# Patient Record
Sex: FEMALE | Race: WHITE | NOT HISPANIC OR LATINO | Employment: UNEMPLOYED | URBAN - NONMETROPOLITAN AREA
[De-identification: names, ages, dates, MRNs, and addresses within clinical notes are randomized per-mention and may not be internally consistent; named-entity substitution may affect disease eponyms.]

---

## 2018-08-07 ENCOUNTER — TRANSCRIBE ORDERS (OUTPATIENT)
Dept: ADMINISTRATIVE | Facility: HOSPITAL | Age: 43
End: 2018-08-07

## 2018-08-07 DIAGNOSIS — R19.01 ABDOMINAL MASS, RIGHT UPPER QUADRANT: Primary | ICD-10-CM

## 2018-09-21 ENCOUNTER — HOSPITAL ENCOUNTER (OUTPATIENT)
Dept: ULTRASOUND IMAGING | Facility: HOSPITAL | Age: 43
Discharge: HOME | End: 2018-09-21
Admitting: NURSE PRACTITIONER

## 2018-09-21 DIAGNOSIS — R19.01 ABDOMINAL MASS, RIGHT UPPER QUADRANT: ICD-10-CM

## 2018-09-21 PROCEDURE — 76705 ECHO EXAM OF ABDOMEN: CPT | Performed by: RADIOLOGY

## 2018-09-21 PROCEDURE — 76700 US EXAM ABDOM COMPLETE: CPT

## 2018-09-27 ENCOUNTER — TRANSCRIBE ORDERS (OUTPATIENT)
Dept: ADMINISTRATIVE | Facility: HOSPITAL | Age: 43
End: 2018-09-27

## 2018-09-27 DIAGNOSIS — R19.01 RIGHT UPPER QUADRANT ABDOMINAL MASS: Primary | ICD-10-CM

## 2018-10-03 ENCOUNTER — HOSPITAL ENCOUNTER (OUTPATIENT)
Dept: CT IMAGING | Facility: HOSPITAL | Age: 43
Discharge: HOME | End: 2018-10-03
Admitting: NURSE PRACTITIONER

## 2018-10-03 DIAGNOSIS — R19.01 RIGHT UPPER QUADRANT ABDOMINAL MASS: ICD-10-CM

## 2018-10-03 LAB — CREAT BLDA-MCNC: 0.8 MG/DL (ref 0.6–1.3)

## 2018-10-03 PROCEDURE — 74178 CT ABD&PLV WO CNTR FLWD CNTR: CPT | Performed by: RADIOLOGY

## 2018-10-03 PROCEDURE — 74170 CT ABD WO CNTRST FLWD CNTRST: CPT

## 2018-10-03 PROCEDURE — 25010000002 IOPAMIDOL 61 % SOLUTION: Performed by: RADIOLOGY

## 2018-10-03 PROCEDURE — 82565 ASSAY OF CREATININE: CPT

## 2018-10-03 RX ADMIN — IOPAMIDOL 100 ML: 612 INJECTION, SOLUTION INTRAVENOUS at 14:03

## 2018-10-09 ENCOUNTER — TRANSCRIBE ORDERS (OUTPATIENT)
Dept: ADMINISTRATIVE | Facility: HOSPITAL | Age: 43
End: 2018-10-09

## 2018-10-09 DIAGNOSIS — R52 PAIN: Primary | ICD-10-CM

## 2018-10-23 ENCOUNTER — APPOINTMENT (OUTPATIENT)
Dept: MRI IMAGING | Facility: HOSPITAL | Age: 43
End: 2018-10-23
Attending: PSYCHIATRY & NEUROLOGY

## 2020-05-29 ENCOUNTER — TRANSCRIBE ORDERS (OUTPATIENT)
Dept: ADMINISTRATIVE | Facility: HOSPITAL | Age: 45
End: 2020-05-29

## 2020-05-29 DIAGNOSIS — R10.13 ABDOMINAL PAIN, EPIGASTRIC: Primary | ICD-10-CM

## 2021-08-22 ENCOUNTER — HOSPITAL ENCOUNTER (EMERGENCY)
Facility: HOSPITAL | Age: 46
Discharge: HOME | End: 2021-08-22
Attending: FAMILY MEDICINE | Admitting: FAMILY MEDICINE

## 2021-08-22 VITALS
OXYGEN SATURATION: 96 % | HEIGHT: 65 IN | WEIGHT: 155 LBS | DIASTOLIC BLOOD PRESSURE: 60 MMHG | HEART RATE: 95 BPM | TEMPERATURE: 99.1 F | BODY MASS INDEX: 25.83 KG/M2 | SYSTOLIC BLOOD PRESSURE: 120 MMHG | RESPIRATION RATE: 20 BRPM

## 2021-08-22 DIAGNOSIS — K02.9 PAIN DUE TO DENTAL CARIES: Primary | ICD-10-CM

## 2021-08-22 PROCEDURE — 99282 EMERGENCY DEPT VISIT SF MDM: CPT

## 2021-08-22 RX ORDER — CLINDAMYCIN HYDROCHLORIDE 300 MG/1
300 CAPSULE ORAL 4 TIMES DAILY
Qty: 40 CAPSULE | Refills: 0 | Status: SHIPPED | OUTPATIENT
Start: 2021-08-22 | End: 2021-09-01

## 2021-08-22 RX ORDER — HYDROCODONE BITARTRATE AND ACETAMINOPHEN 5; 325 MG/1; MG/1
1 TABLET ORAL EVERY 6 HOURS PRN
Qty: 12 TABLET | Refills: 0 | Status: SHIPPED | OUTPATIENT
Start: 2021-08-22 | End: 2022-11-08

## 2021-08-22 NOTE — ED NOTES
Pt discharged all questions answered no acute distress noted skin warm pink and dry      Ros Haynes RN  08/22/21 4298

## 2021-08-22 NOTE — ED PROVIDER NOTES
Subjective     History provided by:  Patient   used: No    Dental Pain  Location:  Lower  Quality:  Throbbing  Severity:  Moderate  Onset quality:  Sudden  Duration:  1 day  Timing:  Constant  Progression:  Worsening  Chronicity:  New  Context: dental caries    Relieved by:  Nothing  Worsened by:  Nothing  Ineffective treatments:  None tried  Associated symptoms: facial pain and facial swelling    Risk factors: lack of dental care        Review of Systems   Constitutional: Negative.    HENT: Positive for dental problem and facial swelling.    Eyes: Negative.    Respiratory: Negative.    Cardiovascular: Negative.    Gastrointestinal: Negative.    Endocrine: Negative.    Genitourinary: Negative.    Musculoskeletal: Negative.    Skin: Negative.    Allergic/Immunologic: Negative.    Neurological: Negative.    Hematological: Negative.    Psychiatric/Behavioral: Negative.    All other systems reviewed and are negative.      No past medical history on file.    No Known Allergies    Past Surgical History:   Procedure Laterality Date   • HYSTERECTOMY         No family history on file.    Social History     Socioeconomic History   • Marital status: Unknown     Spouse name: Not on file   • Number of children: Not on file   • Years of education: Not on file   • Highest education level: Not on file           Objective   Physical Exam  Vitals and nursing note reviewed.   Constitutional:       Appearance: Normal appearance. She is normal weight.   HENT:      Head: Normocephalic and atraumatic.        Right Ear: External ear normal.      Left Ear: External ear normal.      Nose: Nose normal.      Mouth/Throat:      Mouth: Mucous membranes are moist.      Dentition: Dental caries present.      Pharynx: Oropharynx is clear.   Eyes:      Extraocular Movements: Extraocular movements intact.      Conjunctiva/sclera: Conjunctivae normal.      Pupils: Pupils are equal, round, and reactive to light.   Cardiovascular:       Rate and Rhythm: Normal rate and regular rhythm.      Pulses: Normal pulses.      Heart sounds: Normal heart sounds.   Pulmonary:      Effort: Pulmonary effort is normal.      Breath sounds: Normal breath sounds.   Abdominal:      General: Abdomen is flat. Bowel sounds are normal.      Palpations: Abdomen is soft.   Musculoskeletal:         General: Normal range of motion.      Cervical back: Normal range of motion and neck supple.   Skin:     General: Skin is warm and dry.      Capillary Refill: Capillary refill takes less than 2 seconds.   Neurological:      General: No focal deficit present.      Mental Status: She is alert and oriented to person, place, and time. Mental status is at baseline.   Psychiatric:         Mood and Affect: Mood normal.         Behavior: Behavior normal.         Thought Content: Thought content normal.         Judgment: Judgment normal.         Procedures           ED Course  ED Course as of Aug 22 1520   Sun Aug 22, 2021   1519 Instructions to f/u with dentist. Discussed sx that would warrant return to the ED.     [ML]      ED Course User Index  [ML] Ilana Antonio PA                                           MDM  Number of Diagnoses or Management Options  Risk of Complications, Morbidity, and/or Mortality  Presenting problems: minimal  Diagnostic procedures: minimal  Management options: minimal    Patient Progress  Patient progress: improved      Final diagnoses:   Pain due to dental caries       ED Disposition  ED Disposition     ED Disposition Condition Comment    Discharge Stable            DENTAL CLINIC  [address] [place] [address] [phone]  Schedule an appointment as soon as possible for a visit in 1 day      Geovani Watson DMD  [address] [facility] [address] [phone]    Schedule an appointment as soon as possible for a visit in 1 day      Geovani Torre DMD    Schedule an appointment as soon as possible for a visit in 1  day           Medication List      New Prescriptions    clindamycin 300 MG capsule  Commonly known as: CLEOCIN  Take 1 capsule by mouth 4 (Four) Times a Day for 10 days.     HYDROcodone-acetaminophen 5-325 MG per tablet  Commonly known as: NORCO  Take 1 tablet by mouth Every 6 (Six) Hours As Needed for Mild Pain .           Where to Get Your Medications      These medications were sent to [facility] [address] [place], [place] [address] [place] [address] - [phone]  - [phone] [facility] [address] [address]    Phone: [phone]   · HYDROcodone-acetaminophen 5-325 MG per tablet     You can get these medications from any pharmacy    Bring a paper prescription for each of these medications  · clindamycin 300 MG capsule          Ilana Antonio PA  08/22/21 7000

## 2022-01-10 ENCOUNTER — HOSPITAL ENCOUNTER (EMERGENCY)
Facility: HOSPITAL | Age: 47
Discharge: HOME | End: 2022-01-10
Attending: EMERGENCY MEDICINE | Admitting: EMERGENCY MEDICINE

## 2022-01-10 ENCOUNTER — APPOINTMENT (OUTPATIENT)
Dept: GENERAL RADIOLOGY | Facility: HOSPITAL | Age: 47
End: 2022-01-10

## 2022-01-10 VITALS
HEART RATE: 102 BPM | SYSTOLIC BLOOD PRESSURE: 98 MMHG | BODY MASS INDEX: 21.66 KG/M2 | OXYGEN SATURATION: 93 % | HEIGHT: 65 IN | DIASTOLIC BLOOD PRESSURE: 58 MMHG | RESPIRATION RATE: 20 BRPM | WEIGHT: 130 LBS

## 2022-01-10 DIAGNOSIS — F19.10 POLYSUBSTANCE ABUSE: Primary | ICD-10-CM

## 2022-01-10 DIAGNOSIS — T50.901A ACCIDENTAL DRUG OVERDOSE, INITIAL ENCOUNTER: ICD-10-CM

## 2022-01-10 LAB
ALBUMIN SERPL-MCNC: 4.44 G/DL (ref 3.5–5.2)
ALBUMIN/GLOB SERPL: 1.6 G/DL
ALP SERPL-CCNC: 98 U/L (ref 39–117)
ALT SERPL W P-5'-P-CCNC: 10 U/L (ref 1–33)
AMPHET+METHAMPHET UR QL: POSITIVE
AMPHETAMINES UR QL: POSITIVE
ANION GAP SERPL CALCULATED.3IONS-SCNC: 11.3 MMOL/L (ref 5–15)
APAP SERPL-MCNC: <5 MCG/ML (ref 0–30)
AST SERPL-CCNC: 19 U/L (ref 1–32)
B-HCG UR QL: NEGATIVE
BARBITURATES UR QL SCN: NEGATIVE
BASOPHILS # BLD AUTO: 0.05 10*3/MM3 (ref 0–0.2)
BASOPHILS NFR BLD AUTO: 0.5 % (ref 0–1.5)
BENZODIAZ UR QL SCN: POSITIVE
BILIRUB SERPL-MCNC: 0.2 MG/DL (ref 0–1.2)
BILIRUB UR QL STRIP: NEGATIVE
BUN SERPL-MCNC: 12 MG/DL (ref 6–20)
BUN/CREAT SERPL: 12.5 (ref 7–25)
BUPRENORPHINE SERPL-MCNC: NEGATIVE NG/ML
CALCIUM SPEC-SCNC: 9.5 MG/DL (ref 8.6–10.5)
CANNABINOIDS SERPL QL: NEGATIVE
CHLORIDE SERPL-SCNC: 101 MMOL/L (ref 98–107)
CLARITY UR: CLEAR
CO2 SERPL-SCNC: 27.7 MMOL/L (ref 22–29)
COCAINE UR QL: NEGATIVE
COLOR UR: YELLOW
CREAT SERPL-MCNC: 0.96 MG/DL (ref 0.57–1)
DEPRECATED RDW RBC AUTO: 48.4 FL (ref 37–54)
EOSINOPHIL # BLD AUTO: 0.14 10*3/MM3 (ref 0–0.4)
EOSINOPHIL NFR BLD AUTO: 1.4 % (ref 0.3–6.2)
ERYTHROCYTE [DISTWIDTH] IN BLOOD BY AUTOMATED COUNT: 14.1 % (ref 12.3–15.4)
ETHANOL BLD-MCNC: <10 MG/DL (ref 0–10)
ETHANOL UR QL: <0.01 %
FLUAV RNA RESP QL NAA+PROBE: NOT DETECTED
FLUBV RNA RESP QL NAA+PROBE: NOT DETECTED
GFR SERPL CREATININE-BSD FRML MDRD: 63 ML/MIN/1.73
GLOBULIN UR ELPH-MCNC: 2.9 GM/DL
GLUCOSE SERPL-MCNC: 63 MG/DL (ref 65–99)
GLUCOSE UR STRIP-MCNC: NEGATIVE MG/DL
HCT VFR BLD AUTO: 41.6 % (ref 34–46.6)
HGB BLD-MCNC: 13.2 G/DL (ref 12–15.9)
HGB UR QL STRIP.AUTO: NEGATIVE
IMM GRANULOCYTES # BLD AUTO: 0.03 10*3/MM3 (ref 0–0.05)
IMM GRANULOCYTES NFR BLD AUTO: 0.3 % (ref 0–0.5)
KETONES UR QL STRIP: NEGATIVE
LEUKOCYTE ESTERASE UR QL STRIP.AUTO: NEGATIVE
LYMPHOCYTES # BLD AUTO: 3.07 10*3/MM3 (ref 0.7–3.1)
LYMPHOCYTES NFR BLD AUTO: 30 % (ref 19.6–45.3)
MAGNESIUM SERPL-MCNC: 2 MG/DL (ref 1.6–2.6)
MCH RBC QN AUTO: 29.6 PG (ref 26.6–33)
MCHC RBC AUTO-ENTMCNC: 31.7 G/DL (ref 31.5–35.7)
MCV RBC AUTO: 93.3 FL (ref 79–97)
METHADONE UR QL SCN: NEGATIVE
MONOCYTES # BLD AUTO: 0.67 10*3/MM3 (ref 0.1–0.9)
MONOCYTES NFR BLD AUTO: 6.5 % (ref 5–12)
NEUTROPHILS NFR BLD AUTO: 6.29 10*3/MM3 (ref 1.7–7)
NEUTROPHILS NFR BLD AUTO: 61.3 % (ref 42.7–76)
NITRITE UR QL STRIP: NEGATIVE
NRBC BLD AUTO-RTO: 0 /100 WBC (ref 0–0.2)
OPIATES UR QL: POSITIVE
OXYCODONE UR QL SCN: NEGATIVE
PCP UR QL SCN: NEGATIVE
PH UR STRIP.AUTO: 6 [PH] (ref 5–8)
PLATELET # BLD AUTO: 251 10*3/MM3 (ref 140–450)
PMV BLD AUTO: 10.9 FL (ref 6–12)
POTASSIUM SERPL-SCNC: 3.6 MMOL/L (ref 3.5–5.2)
PROPOXYPH UR QL: NEGATIVE
PROT SERPL-MCNC: 7.3 G/DL (ref 6–8.5)
PROT UR QL STRIP: NEGATIVE
QT INTERVAL: 372 MS
QTC INTERVAL: 465 MS
RBC # BLD AUTO: 4.46 10*6/MM3 (ref 3.77–5.28)
SALICYLATES SERPL-MCNC: <0.3 MG/DL
SARS-COV-2 RNA RESP QL NAA+PROBE: NOT DETECTED
SODIUM SERPL-SCNC: 140 MMOL/L (ref 136–145)
SP GR UR STRIP: 1.02 (ref 1–1.03)
TRICYCLICS UR QL SCN: NEGATIVE
UROBILINOGEN UR QL STRIP: NORMAL
WBC NRBC COR # BLD: 10.25 10*3/MM3 (ref 3.4–10.8)

## 2022-01-10 PROCEDURE — 99284 EMERGENCY DEPT VISIT MOD MDM: CPT

## 2022-01-10 PROCEDURE — 82077 ASSAY SPEC XCP UR&BREATH IA: CPT | Performed by: EMERGENCY MEDICINE

## 2022-01-10 PROCEDURE — 93005 ELECTROCARDIOGRAM TRACING: CPT | Performed by: EMERGENCY MEDICINE

## 2022-01-10 PROCEDURE — 80179 DRUG ASSAY SALICYLATE: CPT | Performed by: EMERGENCY MEDICINE

## 2022-01-10 PROCEDURE — 80306 DRUG TEST PRSMV INSTRMNT: CPT | Performed by: EMERGENCY MEDICINE

## 2022-01-10 PROCEDURE — 80143 DRUG ASSAY ACETAMINOPHEN: CPT | Performed by: EMERGENCY MEDICINE

## 2022-01-10 PROCEDURE — 71045 X-RAY EXAM CHEST 1 VIEW: CPT | Performed by: RADIOLOGY

## 2022-01-10 PROCEDURE — 83735 ASSAY OF MAGNESIUM: CPT | Performed by: EMERGENCY MEDICINE

## 2022-01-10 PROCEDURE — 81025 URINE PREGNANCY TEST: CPT | Performed by: EMERGENCY MEDICINE

## 2022-01-10 PROCEDURE — P9612 CATHETERIZE FOR URINE SPEC: HCPCS

## 2022-01-10 PROCEDURE — 87636 SARSCOV2 & INF A&B AMP PRB: CPT | Performed by: EMERGENCY MEDICINE

## 2022-01-10 PROCEDURE — 80053 COMPREHEN METABOLIC PANEL: CPT | Performed by: EMERGENCY MEDICINE

## 2022-01-10 PROCEDURE — 93010 ELECTROCARDIOGRAM REPORT: CPT | Performed by: INTERNAL MEDICINE

## 2022-01-10 PROCEDURE — 85025 COMPLETE CBC W/AUTO DIFF WBC: CPT | Performed by: EMERGENCY MEDICINE

## 2022-01-10 PROCEDURE — 96374 THER/PROPH/DIAG INJ IV PUSH: CPT

## 2022-01-10 PROCEDURE — 81003 URINALYSIS AUTO W/O SCOPE: CPT | Performed by: EMERGENCY MEDICINE

## 2022-01-10 PROCEDURE — 71045 X-RAY EXAM CHEST 1 VIEW: CPT

## 2022-01-10 RX ORDER — DEXTROSE MONOHYDRATE 25 G/50ML
25 INJECTION, SOLUTION INTRAVENOUS ONCE
Status: COMPLETED | OUTPATIENT
Start: 2022-01-10 | End: 2022-01-10

## 2022-01-10 RX ADMIN — SODIUM CHLORIDE 1000 ML: 9 INJECTION, SOLUTION INTRAVENOUS at 15:13

## 2022-01-10 RX ADMIN — DEXTROSE MONOHYDRATE 25 G: 25 INJECTION, SOLUTION INTRAVENOUS at 16:07

## 2022-01-10 NOTE — ED NOTES
Spoke with Marbella from poison control, she states to watch patient for 3 hours, as long as no more narcan is administered and patient is stable, she can be discharged.      Cookie Brown RN  01/10/22 1593

## 2022-01-10 NOTE — ED PROVIDER NOTES
Subjective   Patient presents to ER with heroin overdose. She was given IV Narcan by paramedic      Drug Overdose  Severity:  Severe  Onset quality:  Sudden  Chronicity:  Recurrent  Associated symptoms: fatigue        Review of Systems   Constitutional: Positive for activity change, chills and fatigue.   HENT: Positive for drooling.    Eyes: Negative.    Respiratory: Negative.    Cardiovascular: Negative.    Gastrointestinal: Negative.    Endocrine: Negative.    Genitourinary: Negative.    Musculoskeletal: Negative.    Skin: Negative.    Allergic/Immunologic: Negative.    Neurological: Negative.    Hematological: Negative.    Psychiatric/Behavioral: Positive for decreased concentration. The patient is nervous/anxious.        No past medical history on file.    No Known Allergies    Past Surgical History:   Procedure Laterality Date   • HYSTERECTOMY         No family history on file.    Social History     Socioeconomic History   • Marital status: Unknown           Objective   Physical Exam  Vitals and nursing note reviewed.   HENT:      Head: Normocephalic.      Nose: Nose normal.      Mouth/Throat:      Mouth: Mucous membranes are moist.   Eyes:      Pupils: Pupils are equal, round, and reactive to light.   Cardiovascular:      Rate and Rhythm: Bradycardia present.      Pulses: Normal pulses.   Pulmonary:      Effort: Pulmonary effort is normal.   Abdominal:      General: Abdomen is flat.   Musculoskeletal:         General: Normal range of motion.      Cervical back: Normal range of motion.   Skin:     General: Skin is warm.      Capillary Refill: Capillary refill takes less than 2 seconds.   Neurological:      General: No focal deficit present.      Comments: Obtunded until narcan received   Psychiatric:         Mood and Affect: Mood normal.         Procedures           ED Course  ED Course as of 01/10/22 1636   Mon Ángel 10, 2022   1629 CXR : negative [SILVIA]      ED Course User Index  [SILVIA] Suresh Tucker MD                                                  MDM    Final diagnoses:   Polysubstance abuse (HCC)   Accidental drug overdose, initial encounter       ED Disposition  ED Disposition     ED Disposition Condition Comment    Discharge Stable           Bridget Guadarrama, APRN  [address] [facility] [address]  [phone]    Schedule an appointment as soon as possible for a visit   If symptoms worsen         Medication List      No changes were made to your prescriptions during this visit.          Suresh Tucker MD  01/10/22 1859

## 2022-11-08 ENCOUNTER — OFFICE VISIT (OUTPATIENT)
Dept: PSYCHIATRY | Facility: CLINIC | Age: 47
End: 2022-11-08

## 2022-11-08 VITALS
HEART RATE: 90 BPM | DIASTOLIC BLOOD PRESSURE: 70 MMHG | HEIGHT: 65 IN | SYSTOLIC BLOOD PRESSURE: 118 MMHG | WEIGHT: 147.2 LBS | BODY MASS INDEX: 24.53 KG/M2

## 2022-11-08 DIAGNOSIS — Z79.899 MEDICATION MANAGEMENT: ICD-10-CM

## 2022-11-08 DIAGNOSIS — F43.10 POST TRAUMATIC STRESS DISORDER (PTSD): ICD-10-CM

## 2022-11-08 DIAGNOSIS — F39 MOOD DISORDER: ICD-10-CM

## 2022-11-08 DIAGNOSIS — Z79.899 MEDICATION MANAGEMENT: Primary | ICD-10-CM

## 2022-11-08 DIAGNOSIS — F41.1 GENERALIZED ANXIETY DISORDER: ICD-10-CM

## 2022-11-08 DIAGNOSIS — F51.05 INSOMNIA DUE TO OTHER MENTAL DISORDER: ICD-10-CM

## 2022-11-08 DIAGNOSIS — F11.21 OPIOID DEPENDENCE IN REMISSION: ICD-10-CM

## 2022-11-08 DIAGNOSIS — F33.2 SEVERE EPISODE OF RECURRENT MAJOR DEPRESSIVE DISORDER, WITHOUT PSYCHOTIC FEATURES: Primary | ICD-10-CM

## 2022-11-08 DIAGNOSIS — F99 INSOMNIA DUE TO OTHER MENTAL DISORDER: ICD-10-CM

## 2022-11-08 LAB
EXTERNAL AMPHETAMINE SCREEN URINE: NEGATIVE
EXTERNAL BENZODIAZEPINE SCREEN URINE: NEGATIVE
EXTERNAL BUPRENORPHINE SCREEN URINE: NEGATIVE
EXTERNAL COCAINE SCREEN URINE: NEGATIVE
EXTERNAL MDMA: NEGATIVE
EXTERNAL METHADONE SCREEN URINE: NEGATIVE
EXTERNAL METHAMPHETAMINE SCREEN URINE: NEGATIVE
EXTERNAL OPIATES SCREEN URINE: NEGATIVE
EXTERNAL OXYCODONE SCREEN URINE: NEGATIVE
EXTERNAL THC SCREEN URINE: NEGATIVE

## 2022-11-08 PROCEDURE — 90792 PSYCH DIAG EVAL W/MED SRVCS: CPT | Performed by: NURSE PRACTITIONER

## 2022-11-08 RX ORDER — BUSPIRONE HYDROCHLORIDE 5 MG/1
TABLET ORAL
COMMUNITY
Start: 2022-09-17 | End: 2022-11-08

## 2022-11-08 RX ORDER — NALTREXONE 380 MG
380 KIT INTRAMUSCULAR
COMMUNITY
End: 2023-03-23

## 2022-11-08 RX ORDER — DULOXETIN HYDROCHLORIDE 30 MG/1
CAPSULE, DELAYED RELEASE ORAL
COMMUNITY
Start: 2022-10-17 | End: 2022-11-08

## 2022-11-08 RX ORDER — FLUOXETINE 10 MG/1
10 CAPSULE ORAL DAILY
Qty: 30 CAPSULE | Refills: 0 | Status: SHIPPED | OUTPATIENT
Start: 2022-11-08 | End: 2022-12-12 | Stop reason: SDUPTHER

## 2022-11-08 RX ORDER — OMEPRAZOLE 20 MG/1
CAPSULE, DELAYED RELEASE ORAL
COMMUNITY
Start: 2022-10-17

## 2022-11-08 RX ORDER — TRAZODONE HYDROCHLORIDE 50 MG/1
50 TABLET ORAL NIGHTLY
Qty: 30 TABLET | Refills: 1 | Status: SHIPPED | OUTPATIENT
Start: 2022-11-08 | End: 2023-03-23

## 2022-11-08 RX ORDER — BUPROPION HYDROCHLORIDE 150 MG/1
150 TABLET ORAL EVERY MORNING
Qty: 30 TABLET | Refills: 1 | Status: SHIPPED | OUTPATIENT
Start: 2022-11-08 | End: 2023-01-11 | Stop reason: SDUPTHER
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
## 2022-11-11 ENCOUNTER — PATIENT ROUNDING (BHMG ONLY) (OUTPATIENT)
Dept: PSYCHIATRY | Facility: CLINIC | Age: 47
End: 2022-11-11

## 2022-11-11 NOTE — PROGRESS NOTES
November 11, 2022    Hello, may I speak with Taylor Clark?    My name is Alisha      I am  with [facility]  [facility]  [address] [place] [address]  [place] [address] [phone].    Before we get started may I verify your date of birth? 1975    I am calling to officially welcome you to our practice and ask about your recent visit. Is this a good time to talk? yes    Tell me about your visit with us. What things went well?  everything       We're always looking for ways to make our patients' experiences even better. Do you have recommendations on ways we may improve?  no    Overall were you satisfied with your first visit to our practice? yes       I appreciate you taking the time to speak with me today. Is there anything else I can do for you? no      Thank you, and have a great day.

## 2022-12-12 DIAGNOSIS — F41.1 GENERALIZED ANXIETY DISORDER: ICD-10-CM

## 2022-12-12 DIAGNOSIS — F33.2 SEVERE EPISODE OF RECURRENT MAJOR DEPRESSIVE DISORDER, WITHOUT PSYCHOTIC FEATURES: ICD-10-CM

## 2022-12-12 RX ORDER — FLUOXETINE 10 MG/1
10 CAPSULE ORAL DAILY
Qty: 30 CAPSULE | Refills: 0 | Status: SHIPPED | OUTPATIENT
Start: 2022-12-12 | End: 2023-01-11 | Stop reason: SDUPTHER

## 2023-01-11 DIAGNOSIS — F33.2 SEVERE EPISODE OF RECURRENT MAJOR DEPRESSIVE DISORDER, WITHOUT PSYCHOTIC FEATURES: ICD-10-CM

## 2023-01-11 DIAGNOSIS — F41.1 GENERALIZED ANXIETY DISORDER: ICD-10-CM

## 2023-01-11 RX ORDER — FLUOXETINE 10 MG/1
10 CAPSULE ORAL DAILY
Qty: 30 CAPSULE | Refills: 0 | Status: SHIPPED | OUTPATIENT
Start: 2023-01-11 | End: 2023-03-06

## 2023-01-11 RX ORDER — BUPROPION HYDROCHLORIDE 150 MG/1
150 TABLET ORAL EVERY MORNING
Qty: 30 TABLET | Refills: 1 | Status: SHIPPED | OUTPATIENT
Start: 2023-01-11

## 2023-02-03 ENCOUNTER — HOSPITAL ENCOUNTER (EMERGENCY)
Facility: HOSPITAL | Age: 48
Discharge: HOME | End: 2023-02-04
Attending: STUDENT IN AN ORGANIZED HEALTH CARE EDUCATION/TRAINING PROGRAM | Admitting: STUDENT IN AN ORGANIZED HEALTH CARE EDUCATION/TRAINING PROGRAM
Payer: MEDICAID

## 2023-02-03 ENCOUNTER — APPOINTMENT (OUTPATIENT)
Dept: CT IMAGING | Facility: HOSPITAL | Age: 48
End: 2023-02-03
Payer: MEDICAID

## 2023-02-03 ENCOUNTER — APPOINTMENT (OUTPATIENT)
Dept: GENERAL RADIOLOGY | Facility: HOSPITAL | Age: 48
End: 2023-02-03
Payer: MEDICAID

## 2023-02-03 DIAGNOSIS — T50.901A ACCIDENTAL OVERDOSE, INITIAL ENCOUNTER: Primary | ICD-10-CM

## 2023-02-03 DIAGNOSIS — J18.9 COMMUNITY ACQUIRED PNEUMONIA, UNSPECIFIED LATERALITY: ICD-10-CM

## 2023-02-03 LAB
A-A DO2: 31.9 MMHG (ref 0–300)
ALBUMIN SERPL-MCNC: 3.6 G/DL (ref 3.5–5.2)
ALBUMIN/GLOB SERPL: 1.3 G/DL
ALP SERPL-CCNC: 78 U/L (ref 39–117)
ALT SERPL W P-5'-P-CCNC: 17 U/L (ref 1–33)
AMPHET+METHAMPHET UR QL: NEGATIVE
AMPHETAMINES UR QL: NEGATIVE
ANION GAP SERPL CALCULATED.3IONS-SCNC: 4.8 MMOL/L (ref 5–15)
APAP SERPL-MCNC: <5 MCG/ML (ref 0–30)
ARTERIAL PATENCY WRIST A: ABNORMAL
AST SERPL-CCNC: 30 U/L (ref 1–32)
ATMOSPHERIC PRESS: 739 MMHG
BACTERIA UR QL AUTO: ABNORMAL /HPF
BARBITURATES UR QL SCN: POSITIVE
BASE EXCESS BLDA CALC-SCNC: -1.3 MMOL/L (ref 0–2)
BASOPHILS # BLD AUTO: 0.08 10*3/MM3 (ref 0–0.2)
BASOPHILS NFR BLD AUTO: 0.4 % (ref 0–1.5)
BDY SITE: ABNORMAL
BENZODIAZ UR QL SCN: NEGATIVE
BILIRUB SERPL-MCNC: <0.2 MG/DL (ref 0–1.2)
BILIRUB UR QL STRIP: NEGATIVE
BODY TEMPERATURE: 0 C
BUN SERPL-MCNC: 11 MG/DL (ref 6–20)
BUN/CREAT SERPL: 10.8 (ref 7–25)
BUPRENORPHINE SERPL-MCNC: NEGATIVE NG/ML
CALCIUM SPEC-SCNC: 8.1 MG/DL (ref 8.6–10.5)
CANNABINOIDS SERPL QL: NEGATIVE
CHLORIDE SERPL-SCNC: 108 MMOL/L (ref 98–107)
CLARITY UR: ABNORMAL
CO2 BLDA-SCNC: 27.6 MMOL/L (ref 22–33)
CO2 SERPL-SCNC: 28.2 MMOL/L (ref 22–29)
COCAINE UR QL: NEGATIVE
COHGB MFR BLD: 3.6 % (ref 0–5)
COLOR UR: ABNORMAL
CREAT SERPL-MCNC: 1.02 MG/DL (ref 0.57–1)
D-LACTATE SERPL-SCNC: 1.8 MMOL/L (ref 0.5–2)
DEPRECATED RDW RBC AUTO: 49.1 FL (ref 37–54)
EGFRCR SERPLBLD CKD-EPI 2021: 69.3 ML/MIN/1.73
EOSINOPHIL # BLD AUTO: 0.01 10*3/MM3 (ref 0–0.4)
EOSINOPHIL NFR BLD AUTO: 0.1 % (ref 0.3–6.2)
ERYTHROCYTE [DISTWIDTH] IN BLOOD BY AUTOMATED COUNT: 13.9 % (ref 12.3–15.4)
ETHANOL BLD-MCNC: <10 MG/DL (ref 0–10)
ETHANOL UR QL: <0.01 %
FLUAV RNA RESP QL NAA+PROBE: NOT DETECTED
FLUBV RNA RESP QL NAA+PROBE: NOT DETECTED
GLOBULIN UR ELPH-MCNC: 2.7 GM/DL
GLUCOSE BLDC GLUCOMTR-MCNC: 75 MG/DL (ref 70–130)
GLUCOSE SERPL-MCNC: 56 MG/DL (ref 65–99)
GLUCOSE UR STRIP-MCNC: ABNORMAL MG/DL
HCG SERPL QL: NEGATIVE
HCO3 BLDA-SCNC: 25.9 MMOL/L (ref 20–26)
HCT VFR BLD AUTO: 37.7 % (ref 34–46.6)
HCT VFR BLD CALC: 34.4 % (ref 38–51)
HGB BLD-MCNC: 11.7 G/DL (ref 12–15.9)
HGB BLDA-MCNC: 11.2 G/DL (ref 13.5–17.5)
HGB UR QL STRIP.AUTO: NEGATIVE
HOLD SPECIMEN: NORMAL
HOLD SPECIMEN: NORMAL
HYALINE CASTS UR QL AUTO: ABNORMAL /LPF
IMM GRANULOCYTES # BLD AUTO: 0.15 10*3/MM3 (ref 0–0.05)
IMM GRANULOCYTES NFR BLD AUTO: 0.8 % (ref 0–0.5)
INHALED O2 CONCENTRATION: 21 %
INR PPP: 0.92 (ref 0.9–1.1)
KETONES UR QL STRIP: NEGATIVE
LEUKOCYTE ESTERASE UR QL STRIP.AUTO: ABNORMAL
LIPASE SERPL-CCNC: 18 U/L (ref 13–60)
LYMPHOCYTES # BLD AUTO: 1.61 10*3/MM3 (ref 0.7–3.1)
LYMPHOCYTES NFR BLD AUTO: 8.3 % (ref 19.6–45.3)
Lab: ABNORMAL
Lab: ABNORMAL
MCH RBC QN AUTO: 30.2 PG (ref 26.6–33)
MCHC RBC AUTO-ENTMCNC: 31 G/DL (ref 31.5–35.7)
MCV RBC AUTO: 97.2 FL (ref 79–97)
METHADONE UR QL SCN: NEGATIVE
METHGB BLD QL: 0.1 % (ref 0–3)
MODALITY: ABNORMAL
MONOCYTES # BLD AUTO: 1.02 10*3/MM3 (ref 0.1–0.9)
MONOCYTES NFR BLD AUTO: 5.3 % (ref 5–12)
NEUTROPHILS NFR BLD AUTO: 16.42 10*3/MM3 (ref 1.7–7)
NEUTROPHILS NFR BLD AUTO: 85.1 % (ref 42.7–76)
NITRITE UR QL STRIP: NEGATIVE
NOTE: ABNORMAL
NOTIFIED BY: ABNORMAL
NOTIFIED WHO: ABNORMAL
NRBC BLD AUTO-RTO: 0 /100 WBC (ref 0–0.2)
NT-PROBNP SERPL-MCNC: 113.7 PG/ML (ref 0–450)
OPIATES UR QL: POSITIVE
OXYCODONE UR QL SCN: POSITIVE
OXYHGB MFR BLDV: 83 % (ref 94–99)
PCO2 BLDA: 54.2 MM HG (ref 35–45)
PCO2 TEMP ADJ BLD: ABNORMAL MM[HG]
PCP UR QL SCN: NEGATIVE
PH BLDA: 7.29 PH UNITS (ref 7.35–7.45)
PH UR STRIP.AUTO: 5.5 [PH] (ref 5–8)
PH, TEMP CORRECTED: ABNORMAL
PLATELET # BLD AUTO: 246 10*3/MM3 (ref 140–450)
PMV BLD AUTO: 9.6 FL (ref 6–12)
PO2 BLDA: 52.3 MM HG (ref 83–108)
PO2 TEMP ADJ BLD: ABNORMAL MM[HG]
POTASSIUM SERPL-SCNC: 4.8 MMOL/L (ref 3.5–5.2)
PROPOXYPH UR QL: NEGATIVE
PROT SERPL-MCNC: 6.3 G/DL (ref 6–8.5)
PROT UR QL STRIP: ABNORMAL
PROTHROMBIN TIME: 12.6 SECONDS (ref 12.1–14.7)
RBC # BLD AUTO: 3.88 10*6/MM3 (ref 3.77–5.28)
RBC # UR STRIP: ABNORMAL /HPF
REF LAB TEST METHOD: ABNORMAL
SAO2 % BLDCOA: 86.2 % (ref 94–99)
SARS-COV-2 RNA RESP QL NAA+PROBE: NOT DETECTED
SODIUM SERPL-SCNC: 141 MMOL/L (ref 136–145)
SP GR UR STRIP: 1.02 (ref 1–1.03)
SQUAMOUS #/AREA URNS HPF: ABNORMAL /HPF
TRICYCLICS UR QL SCN: NEGATIVE
UROBILINOGEN UR QL STRIP: ABNORMAL
VENTILATOR MODE: ABNORMAL
WBC # UR STRIP: ABNORMAL /HPF
WBC NRBC COR # BLD: 19.29 10*3/MM3 (ref 3.4–10.8)
WHOLE BLOOD HOLD COAG: NORMAL
WHOLE BLOOD HOLD SPECIMEN: NORMAL

## 2023-02-03 PROCEDURE — 80053 COMPREHEN METABOLIC PANEL: CPT | Performed by: STUDENT IN AN ORGANIZED HEALTH CARE EDUCATION/TRAINING PROGRAM

## 2023-02-03 PROCEDURE — 87636 SARSCOV2 & INF A&B AMP PRB: CPT | Performed by: STUDENT IN AN ORGANIZED HEALTH CARE EDUCATION/TRAINING PROGRAM

## 2023-02-03 PROCEDURE — 83050 HGB METHEMOGLOBIN QUAN: CPT

## 2023-02-03 PROCEDURE — 82077 ASSAY SPEC XCP UR&BREATH IA: CPT | Performed by: STUDENT IN AN ORGANIZED HEALTH CARE EDUCATION/TRAINING PROGRAM

## 2023-02-03 PROCEDURE — 71045 X-RAY EXAM CHEST 1 VIEW: CPT

## 2023-02-03 PROCEDURE — 83690 ASSAY OF LIPASE: CPT | Performed by: STUDENT IN AN ORGANIZED HEALTH CARE EDUCATION/TRAINING PROGRAM

## 2023-02-03 PROCEDURE — 85610 PROTHROMBIN TIME: CPT | Performed by: STUDENT IN AN ORGANIZED HEALTH CARE EDUCATION/TRAINING PROGRAM

## 2023-02-03 PROCEDURE — 74178 CT ABD&PLV WO CNTR FLWD CNTR: CPT

## 2023-02-03 PROCEDURE — 82962 GLUCOSE BLOOD TEST: CPT

## 2023-02-03 PROCEDURE — 25010000002 IOPAMIDOL 61 % SOLUTION: Performed by: STUDENT IN AN ORGANIZED HEALTH CARE EDUCATION/TRAINING PROGRAM

## 2023-02-03 PROCEDURE — 85025 COMPLETE CBC W/AUTO DIFF WBC: CPT | Performed by: STUDENT IN AN ORGANIZED HEALTH CARE EDUCATION/TRAINING PROGRAM

## 2023-02-03 PROCEDURE — 81001 URINALYSIS AUTO W/SCOPE: CPT | Performed by: STUDENT IN AN ORGANIZED HEALTH CARE EDUCATION/TRAINING PROGRAM

## 2023-02-03 PROCEDURE — 96374 THER/PROPH/DIAG INJ IV PUSH: CPT

## 2023-02-03 PROCEDURE — 82375 ASSAY CARBOXYHB QUANT: CPT

## 2023-02-03 PROCEDURE — 80143 DRUG ASSAY ACETAMINOPHEN: CPT | Performed by: STUDENT IN AN ORGANIZED HEALTH CARE EDUCATION/TRAINING PROGRAM

## 2023-02-03 PROCEDURE — 25010000002 ONDANSETRON PER 1 MG: Performed by: STUDENT IN AN ORGANIZED HEALTH CARE EDUCATION/TRAINING PROGRAM

## 2023-02-03 PROCEDURE — 84703 CHORIONIC GONADOTROPIN ASSAY: CPT | Performed by: STUDENT IN AN ORGANIZED HEALTH CARE EDUCATION/TRAINING PROGRAM

## 2023-02-03 PROCEDURE — 36600 WITHDRAWAL OF ARTERIAL BLOOD: CPT

## 2023-02-03 PROCEDURE — 80306 DRUG TEST PRSMV INSTRMNT: CPT | Performed by: STUDENT IN AN ORGANIZED HEALTH CARE EDUCATION/TRAINING PROGRAM

## 2023-02-03 PROCEDURE — 83880 ASSAY OF NATRIURETIC PEPTIDE: CPT | Performed by: STUDENT IN AN ORGANIZED HEALTH CARE EDUCATION/TRAINING PROGRAM

## 2023-02-03 PROCEDURE — 83605 ASSAY OF LACTIC ACID: CPT | Performed by: STUDENT IN AN ORGANIZED HEALTH CARE EDUCATION/TRAINING PROGRAM

## 2023-02-03 PROCEDURE — 99284 EMERGENCY DEPT VISIT MOD MDM: CPT

## 2023-02-03 PROCEDURE — 96361 HYDRATE IV INFUSION ADD-ON: CPT

## 2023-02-03 PROCEDURE — 82805 BLOOD GASES W/O2 SATURATION: CPT

## 2023-02-03 PROCEDURE — 36415 COLL VENOUS BLD VENIPUNCTURE: CPT

## 2023-02-03 RX ORDER — NICOTINE 21 MG/24HR
1 PATCH, TRANSDERMAL 24 HOURS TRANSDERMAL
Status: DISCONTINUED | OUTPATIENT
Start: 2023-02-03 | End: 2023-02-04 | Stop reason: HOSPADM

## 2023-02-03 RX ORDER — ONDANSETRON 2 MG/ML
4 INJECTION INTRAMUSCULAR; INTRAVENOUS ONCE
Status: COMPLETED | OUTPATIENT
Start: 2023-02-03 | End: 2023-02-03

## 2023-02-03 RX ORDER — DOXYCYCLINE 100 MG/1
100 CAPSULE ORAL ONCE
Status: COMPLETED | OUTPATIENT
Start: 2023-02-03 | End: 2023-02-03

## 2023-02-03 RX ORDER — PREDNISONE 20 MG/1
40 TABLET ORAL DAILY
Qty: 10 TABLET | Refills: 0 | Status: SHIPPED | OUTPATIENT
Start: 2023-02-03 | End: 2023-02-04 | Stop reason: SDUPTHER

## 2023-02-03 RX ORDER — DOXYCYCLINE HYCLATE 100 MG/1
100 CAPSULE ORAL 2 TIMES DAILY
Qty: 10 CAPSULE | Refills: 0 | Status: SHIPPED | OUTPATIENT
Start: 2023-02-03 | End: 2023-02-04 | Stop reason: SDUPTHER

## 2023-02-03 RX ORDER — AMOXICILLIN AND CLAVULANATE POTASSIUM 875; 125 MG/1; MG/1
1 TABLET, FILM COATED ORAL EVERY 12 HOURS SCHEDULED
Status: DISCONTINUED | OUTPATIENT
Start: 2023-02-03 | End: 2023-02-04 | Stop reason: HOSPADM

## 2023-02-03 RX ORDER — AMOXICILLIN AND CLAVULANATE POTASSIUM 875; 125 MG/1; MG/1
1 TABLET, FILM COATED ORAL 2 TIMES DAILY
Qty: 14 TABLET | Refills: 0 | Status: SHIPPED | OUTPATIENT
Start: 2023-02-03 | End: 2023-02-04 | Stop reason: SDUPTHER

## 2023-02-03 RX ORDER — SODIUM CHLORIDE 0.9 % (FLUSH) 0.9 %
10 SYRINGE (ML) INJECTION AS NEEDED
Status: DISCONTINUED | OUTPATIENT
Start: 2023-02-03 | End: 2023-02-04 | Stop reason: HOSPADM

## 2023-02-03 RX ORDER — NALOXONE HYDROCHLORIDE 4 MG/.1ML
1 SPRAY NASAL AS NEEDED
Qty: 1 EACH | Refills: 0 | Status: SHIPPED | OUTPATIENT
Start: 2023-02-03 | End: 2023-02-04 | Stop reason: SDUPTHER

## 2023-02-03 RX ADMIN — NICOTINE 1 PATCH: 14 PATCH, EXTENDED RELEASE TRANSDERMAL at 20:47

## 2023-02-03 RX ADMIN — SODIUM CHLORIDE 1000 ML: 9 INJECTION, SOLUTION INTRAVENOUS at 18:04

## 2023-02-03 RX ADMIN — ONDANSETRON 4 MG: 2 INJECTION INTRAMUSCULAR; INTRAVENOUS at 18:04

## 2023-02-03 RX ADMIN — DOXYCYCLINE 100 MG: 100 CAPSULE ORAL at 21:56

## 2023-02-03 RX ADMIN — AMOXICILLIN AND CLAVULANATE POTASSIUM 1 TABLET: 875; 125 TABLET, FILM COATED ORAL at 21:56

## 2023-02-03 RX ADMIN — IOPAMIDOL 87 ML: 612 INJECTION, SOLUTION INTRAVENOUS at 19:59

## 2023-02-03 NOTE — ED PROVIDER NOTES
"Subjective   History of Present Illness  Patient is a 47-year-old female who comes to the ER via EMS after drug overdose.  Family administered 1 mg of Narcan and EMS gave 1 mg gram of Narcan in route.  On exam she is awake, alert and orient x3.  She noted taking 1 800 mg tablet of gabapentin and snorted heroin.  Her daughter found her bent over sitting in the living room.  After receiving the first Narcan she \"woke up \", was alert and oriented per family.  She had nausea with emesis.  She has a history of drug abuse and stated she relapsed.  She has family present at bedside.  She stated she was interested in detox and rehab.  She denies any other symptoms.  Also of note, daughter at bedside was concerned with lymphadenopathy in the groin given her history of breast cancer at age 40 (right-sided breast cancer status postmastectomy bilaterally and chemotherapy).  She had been trying to get her to receive a physician but has yet to do so.        Review of Systems   Constitutional: Negative for chills, fatigue and fever.   HENT: Negative for ear pain, sinus pain and sore throat.    Respiratory: Negative for cough, chest tightness, shortness of breath and wheezing.    Cardiovascular: Negative for chest pain, palpitations and leg swelling.   Gastrointestinal: Positive for nausea and vomiting. Negative for abdominal pain, constipation and diarrhea.   Genitourinary: Negative for dysuria, hematuria and urgency.   Musculoskeletal: Negative for arthralgias and myalgias.   Neurological: Negative for dizziness, syncope and light-headedness.   Psychiatric/Behavioral: Negative for confusion.       Past Medical History:   Diagnosis Date   • Anxiety    • Depression    • PTSD (post-traumatic stress disorder)        No Known Allergies    Past Surgical History:   Procedure Laterality Date   • HYSTERECTOMY     • MASTECTOMY Bilateral     est. date 2016       Family History   Problem Relation Age of Onset   • Depression Mother    • " Anxiety disorder Mother    • Bipolar disorder Cousin        Social History     Socioeconomic History   • Marital status: Unknown   Tobacco Use   • Smoking status: Every Day     Packs/day: 1.00     Years: 30.00     Pack years: 30.00     Types: Cigarettes   Substance and Sexual Activity   • Alcohol use: Not Currently           Objective   Physical Exam  Vitals and nursing note reviewed.   Constitutional:       Appearance: Normal appearance. She is normal weight.   HENT:      Head: Normocephalic and atraumatic.      Nose: Nose normal.      Mouth/Throat:      Mouth: Mucous membranes are moist.      Pharynx: Oropharynx is clear.   Eyes:      Extraocular Movements: Extraocular movements intact.      Pupils: Pupils are equal, round, and reactive to light.   Cardiovascular:      Rate and Rhythm: Normal rate and regular rhythm.      Pulses: Normal pulses.      Heart sounds: Normal heart sounds.   Pulmonary:      Effort: Pulmonary effort is normal.      Breath sounds: Normal breath sounds.   Abdominal:      General: Abdomen is flat.      Palpations: Abdomen is soft.      Hernia: A hernia is present.      Comments: Groin lymphadenopathy   Musculoskeletal:         General: Normal range of motion.      Cervical back: Normal range of motion and neck supple.   Skin:     General: Skin is warm and dry.   Neurological:      General: No focal deficit present.      Mental Status: She is alert and oriented to person, place, and time.   Psychiatric:         Mood and Affect: Mood normal.         Behavior: Behavior normal.         Procedures           ED Course                                           Medical Decision Making  --Patient with confirmed overdose on heroin and gabapentin.  Received 2 mg of Narcan and alert and oriented x3 with family at bedside on arrival.  Hemodynamically stable.  Denies any suicidal or homicidal ideations, overdose accidental.  --Labs WBC elevated, likely reactive given OD-, glucose 56, Leukos on last  admission 63 no evidence of infectious process therefore hold antibiotics  --CT abdomen/pelvis with contrast noted GGO's bilaterally in the bases, mildly enlarged known soft tissue nodule in the abdomen favor benign I did discuss this with the family and recommended outpatient follow-up with hematology/oncology given her history of breast cancer  -- She did become hypoxic on room air with SPO2 in the mid 80s, and ABG confirmed hypoxemia with a PaO2 of 52, 90% when placed on 2 L.  I suspect she has pneumonitis but will empirically treat given drug overdose with p.o. antibiotics, steroids.  She has remained hemodynamically stable with MAP in the 70s, she says BP always runs low normal  --In regards to overdose, her family present at bedside contacted a detox and rehab facility who has agreed to accept the patient.  Patient's daughter stated she will contact facility in the morning in order to get her enrolled.  Patient is agreeable to the above treatment plan and will discharge with outpatient follow-up    Amount and/or Complexity of Data Reviewed  Labs: ordered.  Radiology: ordered.      Risk  OTC drugs.  Prescription drug management.          Final diagnoses:   Accidental overdose, initial encounter   Community acquired pneumonia, unspecified laterality       ED Disposition  ED Disposition     ED Disposition   Discharge    Condition   Stable    Comment   --             Bridget Guadarrama, APRN  [address] [facility] [place] [address] [phone]    In 1 week           Medication List      New Prescriptions    amoxicillin-clavulanate 875-125 MG per tablet  Commonly known as: AUGMENTIN  Take 1 tablet by mouth 2 (Two) Times a Day for 7 days.     doxycycline 100 MG capsule  Commonly known as: VIBRAMYCIN  Take 1 capsule by mouth 2 (Two) Times a Day for 5 days.     naloxone 4 MG/0.1ML nasal spray  Commonly known as: NARCAN  1 spray into the nostril(s) as directed by provider As Needed (overdose).     predniSONE 20 MG  tablet  Commonly known as: DELTASONE  Take 2 tablets by mouth Daily for 5 days.           Where to Get Your Medications      These medications were sent to [facility], [place] - [address] - [phone] [facility] [phone]   [address] [place] [address]    Phone: [phone]   · amoxicillin-clavulanate 875-125 MG per tablet  · doxycycline 100 MG capsule  · naloxone 4 MG/0.1ML nasal spray  · predniSONE 20 MG tablet          James Call,   02/03/23 [id number]

## 2023-02-04 VITALS
WEIGHT: 135 LBS | HEART RATE: 83 BPM | RESPIRATION RATE: 18 BRPM | SYSTOLIC BLOOD PRESSURE: 92 MMHG | HEIGHT: 65 IN | TEMPERATURE: 97.6 F | OXYGEN SATURATION: 96 % | BODY MASS INDEX: 22.49 KG/M2 | DIASTOLIC BLOOD PRESSURE: 68 MMHG

## 2023-02-04 RX ORDER — PREDNISONE 20 MG/1
40 TABLET ORAL DAILY
Qty: 10 TABLET | Refills: 0 | Status: SHIPPED | OUTPATIENT
Start: 2023-02-04 | End: 2023-02-09

## 2023-02-04 RX ORDER — NALOXONE HYDROCHLORIDE 4 MG/.1ML
1 SPRAY NASAL AS NEEDED
Qty: 1 EACH | Refills: 0 | Status: SHIPPED | OUTPATIENT
Start: 2023-02-04 | End: 2023-03-23

## 2023-02-04 RX ORDER — DOXYCYCLINE HYCLATE 100 MG/1
100 CAPSULE ORAL 2 TIMES DAILY
Qty: 10 CAPSULE | Refills: 0 | Status: SHIPPED | OUTPATIENT
Start: 2023-02-04 | End: 2023-02-09

## 2023-02-04 RX ORDER — AMOXICILLIN AND CLAVULANATE POTASSIUM 875; 125 MG/1; MG/1
1 TABLET, FILM COATED ORAL 2 TIMES DAILY
Qty: 14 TABLET | Refills: 0 | Status: SHIPPED | OUTPATIENT
Start: 2023-02-04 | End: 2023-02-11

## 2023-03-03 DIAGNOSIS — F33.2 SEVERE EPISODE OF RECURRENT MAJOR DEPRESSIVE DISORDER, WITHOUT PSYCHOTIC FEATURES: ICD-10-CM

## 2023-03-03 DIAGNOSIS — F41.1 GENERALIZED ANXIETY DISORDER: ICD-10-CM

## 2023-03-06 RX ORDER — FLUOXETINE 10 MG/1
CAPSULE ORAL
Qty: 30 CAPSULE | Refills: 0 | Status: SHIPPED | OUTPATIENT
Start: 2023-03-06

## 2023-03-14 ENCOUNTER — HOSPITAL ENCOUNTER (OUTPATIENT)
Dept: GENERAL RADIOLOGY | Facility: HOSPITAL | Age: 48
Discharge: HOME | End: 2023-03-14
Admitting: PHYSICIAN ASSISTANT
Payer: MEDICAID

## 2023-03-14 ENCOUNTER — TRANSCRIBE ORDERS (OUTPATIENT)
Dept: ADMINISTRATIVE | Facility: HOSPITAL | Age: 48
End: 2023-03-14
Payer: MEDICAID

## 2023-03-14 ENCOUNTER — OFFICE VISIT (OUTPATIENT)
Dept: SURGERY | Facility: CLINIC | Age: 48
End: 2023-03-14
Payer: MEDICAID

## 2023-03-14 VITALS — HEIGHT: 65 IN | WEIGHT: 159 LBS | BODY MASS INDEX: 26.49 KG/M2

## 2023-03-14 DIAGNOSIS — M79.601 PAIN IN RIGHT ARM: ICD-10-CM

## 2023-03-14 DIAGNOSIS — K59.1 FUNCTIONAL DIARRHEA: Primary | ICD-10-CM

## 2023-03-14 DIAGNOSIS — M79.601 PAIN IN RIGHT ARM: Primary | ICD-10-CM

## 2023-03-14 DIAGNOSIS — Z12.11 ENCOUNTER FOR SCREENING COLONOSCOPY: ICD-10-CM

## 2023-03-14 PROCEDURE — 1159F MED LIST DOCD IN RCRD: CPT | Performed by: SURGERY

## 2023-03-14 PROCEDURE — 99203 OFFICE O/P NEW LOW 30 MIN: CPT | Performed by: SURGERY

## 2023-03-14 PROCEDURE — 73080 X-RAY EXAM OF ELBOW: CPT

## 2023-03-14 PROCEDURE — 73080 X-RAY EXAM OF ELBOW: CPT | Performed by: RADIOLOGY

## 2023-03-14 PROCEDURE — 1160F RVW MEDS BY RX/DR IN RCRD: CPT | Performed by: SURGERY

## 2023-03-14 RX ORDER — POLYETHYLENE GLYCOL 3350, SODIUM CHLORIDE, SODIUM BICARBONATE, POTASSIUM CHLORIDE 420; 11.2; 5.72; 1.48 G/4L; G/4L; G/4L; G/4L
4000 POWDER, FOR SOLUTION ORAL ONCE
Qty: 4000 ML | Refills: 0 | Status: SHIPPED | OUTPATIENT
Start: 2023-03-14 | End: 2023-03-14

## 2023-03-14 RX ORDER — LEVOTHYROXINE SODIUM 0.05 MG/1
TABLET ORAL
COMMUNITY
Start: 2023-03-09

## 2023-03-14 RX ORDER — MELOXICAM 7.5 MG/1
TABLET ORAL
COMMUNITY
Start: 2023-02-16

## 2023-03-14 NOTE — PROGRESS NOTES
Subjective   Taylor Clark is a 47 y.o. female.     Chief Complaint: diarrhea    History of Present Illness She is a 46 yo who has some abdominal cramping and diarrhea at times and has never had a colonoscopy. She does think there were people in her family who had colon cancer as well.     The following portions of the patient's history were reviewed and updated as appropriate: current medications, past family history, past medical history, past social history, past surgical history and problem list.    Review of Systems   Constitutional: Negative for activity change, appetite change, chills, fever and unexpected weight change.   HENT: Negative for congestion, facial swelling and sore throat.    Eyes: Negative for photophobia and visual disturbance.   Respiratory: Negative for chest tightness, shortness of breath and wheezing.    Cardiovascular: Negative for chest pain, palpitations and leg swelling.   Gastrointestinal: Positive for abdominal pain, constipation and diarrhea. Negative for abdominal distention, anal bleeding, blood in stool, nausea, rectal pain and vomiting.   Endocrine: Negative for cold intolerance, heat intolerance, polydipsia and polyuria.   Genitourinary: Negative for difficulty urinating, dysuria, flank pain and urgency.   Musculoskeletal: Negative for back pain and myalgias.   Skin: Negative for rash and wound.   Allergic/Immunologic: Negative for immunocompromised state.   Neurological: Negative for dizziness, seizures, syncope, light-headedness, numbness and headaches.   Hematological: Negative for adenopathy. Does not bruise/bleed easily.   Psychiatric/Behavioral: Negative for behavioral problems and confusion. The patient is not nervous/anxious.        Objective   Physical Exam  Vitals reviewed.   Constitutional:       General: She is not in acute distress.     Appearance: She is well-developed. She is not ill-appearing.   HENT:      Head: Normocephalic. No laceration. Hair is  normal.      Right Ear: Hearing and ear canal normal.      Left Ear: Hearing and ear canal normal.      Nose: Nose normal.      Right Sinus: No maxillary sinus tenderness or frontal sinus tenderness.      Left Sinus: No maxillary sinus tenderness or frontal sinus tenderness.   Eyes:      General: Lids are normal.      Conjunctiva/sclera: Conjunctivae normal.      Pupils: Pupils are equal, round, and reactive to light.   Neck:      Thyroid: No thyroid mass or thyromegaly.      Vascular: No JVD.      Trachea: No tracheal tenderness or tracheal deviation.   Cardiovascular:      Rate and Rhythm: Normal rate and regular rhythm.      Heart sounds: No murmur heard.    No gallop.   Pulmonary:      Effort: Pulmonary effort is normal.      Breath sounds: Normal breath sounds. No stridor. No wheezing.   Chest:      Chest wall: No tenderness.   Abdominal:      General: Bowel sounds are normal. There is no distension.      Palpations: Abdomen is soft. There is no mass.      Tenderness: There is no abdominal tenderness. There is no guarding or rebound.      Hernia: No hernia is present.   Musculoskeletal:         General: No deformity.      Cervical back: Normal range of motion.   Lymphadenopathy:      Cervical: No cervical adenopathy.      Upper Body:      Right upper body: No supraclavicular adenopathy.      Left upper body: No supraclavicular adenopathy.   Skin:     General: Skin is warm and dry.      Coloration: Skin is not pale.      Findings: No erythema or rash.   Neurological:      Mental Status: She is alert and oriented to person, place, and time.      Motor: No abnormal muscle tone.   Psychiatric:         Behavior: Behavior normal.         Thought Content: Thought content normal.         Past Medical History:   Diagnosis Date   • Anxiety    • Breast cancer (HCC) 2012    Double masectomy, reconstruction, chemo   • Cancer (HCC) 2012    Breast cancer   • Depression    • PTSD (post-traumatic stress disorder)        Family  History   Problem Relation Age of Onset   • Depression Mother    • Anxiety disorder Mother    • Hypertension Mother    • Bipolar disorder Cousin    • Arthritis Father    • Diabetes Father    • Hypertension Father        Social History     Tobacco Use   • Smoking status: Every Day     Packs/day: 1.00     Years: 15.00     Pack years: 15.00     Types: Cigarettes     Start date: 5/1/1989   Vaping Use   • Vaping Use: Never used   Substance Use Topics   • Alcohol use: Not Currently   • Drug use: Never       Past Surgical History:   Procedure Laterality Date   • BREAST BIOPSY  2012   • BREAST SURGERY  2012    Reconstruction from double masectomy   • HYSTERECTOMY     • MASTECTOMY Bilateral     est. date 2016       Current Outpatient Medications   Medication Instructions   • buPROPion XL (WELLBUTRIN XL) 150 mg, Oral, Every Morning   • FLUoxetine (PROzac) 10 MG capsule TAKE ONE CAPSULE BY MOUTH DAILY   • levothyroxine (SYNTHROID, LEVOTHROID) 50 MCG tablet No dose, route, or frequency recorded.   • meloxicam (MOBIC) 7.5 MG tablet No dose, route, or frequency recorded.   • naloxone (NARCAN) 4 MG/0.1ML nasal spray 1 spray, Nasal, As Needed   • omeprazole (priLOSEC) 20 MG capsule No dose, route, or frequency recorded.   • traZODone (DESYREL) 50 mg, Oral, Nightly   • Vivitrol 380 mg, Intramuscular, Every 28 Days         Assessment & Plan   Diagnoses and all orders for this visit:    1. Functional diarrhea (Primary)    2. Encounter for screening colonoscopy    colonoscopy

## 2023-03-17 ENCOUNTER — TELEPHONE (OUTPATIENT)
Dept: SURGERY | Facility: CLINIC | Age: 48
End: 2023-03-17
Payer: MEDICAID

## 2023-03-23 RX ORDER — BUPRENORPHINE HYDROCHLORIDE AND NALOXONE HYDROCHLORIDE DIHYDRATE 8; 2 MG/1; MG/1
1 TABLET SUBLINGUAL DAILY
COMMUNITY

## 2023-03-24 ENCOUNTER — HOSPITAL ENCOUNTER (OUTPATIENT)
Facility: HOSPITAL | Age: 48
Setting detail: HOSPITAL OUTPATIENT SURGERY
Discharge: HOME | End: 2023-03-24
Attending: SURGERY | Admitting: SURGERY
Payer: MEDICAID

## 2023-03-24 ENCOUNTER — ANESTHESIA EVENT (OUTPATIENT)
Dept: PERIOP | Facility: HOSPITAL | Age: 48
End: 2023-03-24
Payer: MEDICAID

## 2023-03-24 ENCOUNTER — ANESTHESIA (OUTPATIENT)
Dept: PERIOP | Facility: HOSPITAL | Age: 48
End: 2023-03-24
Payer: MEDICAID

## 2023-03-24 VITALS
OXYGEN SATURATION: 98 % | TEMPERATURE: 97.6 F | WEIGHT: 160 LBS | BODY MASS INDEX: 26.66 KG/M2 | HEIGHT: 65 IN | HEART RATE: 91 BPM | DIASTOLIC BLOOD PRESSURE: 68 MMHG | SYSTOLIC BLOOD PRESSURE: 101 MMHG | RESPIRATION RATE: 18 BRPM

## 2023-03-24 PROCEDURE — 45380 COLONOSCOPY AND BIOPSY: CPT | Performed by: SURGERY

## 2023-03-24 PROCEDURE — 25010000002 PROPOFOL 10 MG/ML EMULSION: Performed by: NURSE ANESTHETIST, CERTIFIED REGISTERED

## 2023-03-24 PROCEDURE — 25010000002 MIDAZOLAM PER 1 MG: Performed by: NURSE ANESTHETIST, CERTIFIED REGISTERED

## 2023-03-24 RX ORDER — FENTANYL CITRATE 50 UG/ML
50 INJECTION, SOLUTION INTRAMUSCULAR; INTRAVENOUS
Status: DISCONTINUED | OUTPATIENT
Start: 2023-03-24 | End: 2023-03-24 | Stop reason: HOSPADM

## 2023-03-24 RX ORDER — OXYCODONE HYDROCHLORIDE AND ACETAMINOPHEN 5; 325 MG/1; MG/1
1 TABLET ORAL ONCE AS NEEDED
Status: DISCONTINUED | OUTPATIENT
Start: 2023-03-24 | End: 2023-03-24 | Stop reason: HOSPADM

## 2023-03-24 RX ORDER — SODIUM CHLORIDE, SODIUM LACTATE, POTASSIUM CHLORIDE, CALCIUM CHLORIDE 600; 310; 30; 20 MG/100ML; MG/100ML; MG/100ML; MG/100ML
100 INJECTION, SOLUTION INTRAVENOUS ONCE AS NEEDED
Status: DISCONTINUED | OUTPATIENT
Start: 2023-03-24 | End: 2023-03-24 | Stop reason: HOSPADM

## 2023-03-24 RX ORDER — MIDAZOLAM HYDROCHLORIDE 1 MG/ML
1 INJECTION INTRAMUSCULAR; INTRAVENOUS
Status: DISCONTINUED | OUTPATIENT
Start: 2023-03-24 | End: 2023-03-24 | Stop reason: HOSPADM

## 2023-03-24 RX ORDER — IPRATROPIUM BROMIDE AND ALBUTEROL SULFATE 2.5; .5 MG/3ML; MG/3ML
3 SOLUTION RESPIRATORY (INHALATION) ONCE AS NEEDED
Status: DISCONTINUED | OUTPATIENT
Start: 2023-03-24 | End: 2023-03-24 | Stop reason: HOSPADM

## 2023-03-24 RX ORDER — SODIUM CHLORIDE 0.9 % (FLUSH) 0.9 %
10 SYRINGE (ML) INJECTION EVERY 12 HOURS SCHEDULED
Status: DISCONTINUED | OUTPATIENT
Start: 2023-03-24 | End: 2023-03-24 | Stop reason: HOSPADM

## 2023-03-24 RX ORDER — SODIUM CHLORIDE 0.9 % (FLUSH) 0.9 %
10 SYRINGE (ML) INJECTION AS NEEDED
Status: DISCONTINUED | OUTPATIENT
Start: 2023-03-24 | End: 2023-03-24 | Stop reason: HOSPADM

## 2023-03-24 RX ORDER — ONDANSETRON 2 MG/ML
4 INJECTION INTRAMUSCULAR; INTRAVENOUS AS NEEDED
Status: DISCONTINUED | OUTPATIENT
Start: 2023-03-24 | End: 2023-03-24 | Stop reason: HOSPADM

## 2023-03-24 RX ORDER — MIDAZOLAM HYDROCHLORIDE 1 MG/ML
INJECTION INTRAMUSCULAR; INTRAVENOUS AS NEEDED
Status: DISCONTINUED | OUTPATIENT
Start: 2023-03-24 | End: 2023-03-24 | Stop reason: SURG

## 2023-03-24 RX ORDER — SODIUM CHLORIDE, SODIUM LACTATE, POTASSIUM CHLORIDE, CALCIUM CHLORIDE 600; 310; 30; 20 MG/100ML; MG/100ML; MG/100ML; MG/100ML
125 INJECTION, SOLUTION INTRAVENOUS ONCE
Status: DISCONTINUED | OUTPATIENT
Start: 2023-03-24 | End: 2023-03-24 | Stop reason: HOSPADM

## 2023-03-24 RX ORDER — MEPERIDINE HYDROCHLORIDE 25 MG/ML
12.5 INJECTION INTRAMUSCULAR; INTRAVENOUS; SUBCUTANEOUS
Status: DISCONTINUED | OUTPATIENT
Start: 2023-03-24 | End: 2023-03-24 | Stop reason: HOSPADM

## 2023-03-24 RX ORDER — PHENYLEPHRINE HCL IN 0.9% NACL 1 MG/10 ML
SYRINGE (ML) INTRAVENOUS AS NEEDED
Status: DISCONTINUED | OUTPATIENT
Start: 2023-03-24 | End: 2023-03-24 | Stop reason: SURG

## 2023-03-24 RX ORDER — KETOROLAC TROMETHAMINE 30 MG/ML
30 INJECTION, SOLUTION INTRAMUSCULAR; INTRAVENOUS EVERY 6 HOURS PRN
Status: DISCONTINUED | OUTPATIENT
Start: 2023-03-24 | End: 2023-03-24 | Stop reason: HOSPADM

## 2023-03-24 RX ORDER — PROPOFOL 10 MG/ML
VIAL (ML) INTRAVENOUS AS NEEDED
Status: DISCONTINUED | OUTPATIENT
Start: 2023-03-24 | End: 2023-03-24 | Stop reason: SURG

## 2023-03-24 RX ORDER — SODIUM CHLORIDE 9 MG/ML
40 INJECTION, SOLUTION INTRAVENOUS AS NEEDED
Status: DISCONTINUED | OUTPATIENT
Start: 2023-03-24 | End: 2023-03-24 | Stop reason: HOSPADM

## 2023-03-24 RX ADMIN — Medication 100 MCG: at 10:34

## 2023-03-24 RX ADMIN — Medication 100 MCG: at 10:29

## 2023-03-24 RX ADMIN — PROPOFOL 200 MCG/KG/MIN: 10 INJECTION, EMULSION INTRAVENOUS at 10:19

## 2023-03-24 RX ADMIN — PROPOFOL 50 MG: 10 INJECTION, EMULSION INTRAVENOUS at 10:19

## 2023-03-24 RX ADMIN — MIDAZOLAM HYDROCHLORIDE 2 MG: 1 INJECTION, SOLUTION INTRAMUSCULAR; INTRAVENOUS at 10:24

## 2023-03-24 RX ADMIN — PROPOFOL 30 MG: 10 INJECTION, EMULSION INTRAVENOUS at 10:20

## 2023-03-24 NOTE — OP NOTE
COLONOSCOPY  Procedure Note    Taylor Clark  3/24/2023    Pre-op Diagnosis:   Functional diarrhea [K59.1]  Encounter for screening colonoscopy [Z12.11]    Post-op Diagnosis: long colon        Procedure(s):  COLONOSCOPY    Surgeon(s):  Rocky Allen MD    Anesthesia: Anesthesia type not filed in the log.    Staff:   Circulator: Cheryl Chapman RN  Endo Technician: Maryanne Montes    Estimated Blood Loss: none    Specimens:                * No orders in the log *      Drains: * No LDAs found *    Procedure: The scope passed slowly from the rectum to the cecum. The colon was very long and large, but was spastic. The ileocecal valve and terminal ileum were unremarkable. There was no indication of inflammation and polyps or other lesions seen.     Findings: long normal colon         Complications: none   Grafts / Implants N/A    Rocky Allen MD     Date: 3/24/2023  Time: 10:42 EDT

## 2023-03-24 NOTE — ANESTHESIA PREPROCEDURE EVALUATION
Anesthesia Evaluation     no history of anesthetic complications:  NPO Solid Status: > 8 hours  NPO Liquid Status: > 8 hours           Airway   Mallampati: II  TM distance: >3 FB  Neck ROM: full  No difficulty expected  Dental    (+) poor dentition    Pulmonary - normal exam   Cardiovascular - normal exam        Neuro/Psych  (+) psychiatric history PTSD,    GI/Hepatic/Renal/Endo    (+)  GERD,      Musculoskeletal     Abdominal  - normal exam    Bowel sounds: normal.   Substance History   (+) drug use     OB/GYN          Other      history of cancer                    Anesthesia Plan    ASA 3     general     intravenous induction     Anesthetic plan, risks, benefits, and alternatives have been provided, discussed and informed consent has been obtained with: patient.        CODE STATUS:

## 2023-03-24 NOTE — ANESTHESIA POSTPROCEDURE EVALUATION
Patient: Taylor Clark    Procedure Summary     Date: 03/24/23 Room / Location: [facility] OR [address] [place] [address] COR OR    Anesthesia Start: 1017 Anesthesia Stop: 1042    Procedure: COLONOSCOPY Diagnosis:       Functional diarrhea      Encounter for screening colonoscopy      (Functional diarrhea [K59.1])      (Encounter for screening colonoscopy [Z12.11])    Surgeons: Rocky Allen MD Provider: Bob Cortes MD    Anesthesia Type: general ASA Status: 3          Anesthesia Type: general    Vitals  Vitals Value Taken Time   /68 03/24/23 1113   Temp 97.6 °F (36.4 °C) 03/24/23 1043   Pulse 91 03/24/23 1113   Resp 18 03/24/23 1113   SpO2 98 % 03/24/23 1113           Post Anesthesia Care and Evaluation    Patient location during evaluation: PHASE II  Patient participation: complete - patient participated  Level of consciousness: awake and alert  Pain score: 0  Pain management: adequate    Airway patency: patent  Anesthetic complications: No anesthetic complications    Cardiovascular status: acceptable  Respiratory status: acceptable  Hydration status: acceptable

## 2023-04-13 DIAGNOSIS — F99 INSOMNIA DUE TO OTHER MENTAL DISORDER: ICD-10-CM

## 2023-04-13 DIAGNOSIS — F41.1 GENERALIZED ANXIETY DISORDER: ICD-10-CM

## 2023-04-13 DIAGNOSIS — F51.05 INSOMNIA DUE TO OTHER MENTAL DISORDER: ICD-10-CM

## 2023-04-13 RX ORDER — TRAZODONE HYDROCHLORIDE 50 MG/1
TABLET ORAL
Qty: 30 TABLET | Refills: 1 | OUTPATIENT
Start: 2023-04-13

## 2023-10-04 ENCOUNTER — TRANSCRIBE ORDERS (OUTPATIENT)
Dept: ADMINISTRATIVE | Facility: HOSPITAL | Age: 48
End: 2023-10-04
Payer: MEDICAID

## 2023-10-04 DIAGNOSIS — M25.529 ELBOW PAIN, UNSPECIFIED LATERALITY: Primary | ICD-10-CM

## (undated) DEVICE — ENDOGATOR AUXILIARY WATER JET CONNECTOR: Brand: ENDOGATOR

## (undated) DEVICE — Device

## (undated) DEVICE — Device: Brand: DEFENDO AIR/WATER/SUCTION AND BIOPSY VALVE